# Patient Record
Sex: MALE | Race: WHITE | NOT HISPANIC OR LATINO
[De-identification: names, ages, dates, MRNs, and addresses within clinical notes are randomized per-mention and may not be internally consistent; named-entity substitution may affect disease eponyms.]

---

## 2018-08-21 PROBLEM — Z00.00 ENCOUNTER FOR PREVENTIVE HEALTH EXAMINATION: Status: ACTIVE | Noted: 2018-08-21

## 2018-08-24 ENCOUNTER — APPOINTMENT (OUTPATIENT)
Dept: PULMONOLOGY | Facility: CLINIC | Age: 78
End: 2018-08-24
Payer: MEDICARE

## 2018-08-24 VITALS
RESPIRATION RATE: 16 BRPM | HEIGHT: 66 IN | TEMPERATURE: 97.5 F | HEART RATE: 62 BPM | SYSTOLIC BLOOD PRESSURE: 111 MMHG | DIASTOLIC BLOOD PRESSURE: 70 MMHG | BODY MASS INDEX: 35.68 KG/M2 | OXYGEN SATURATION: 95 % | WEIGHT: 222 LBS

## 2018-08-24 DIAGNOSIS — R06.00 DYSPNEA, UNSPECIFIED: ICD-10-CM

## 2018-08-24 DIAGNOSIS — Z86.39 PERSONAL HISTORY OF OTHER ENDOCRINE, NUTRITIONAL AND METABOLIC DISEASE: ICD-10-CM

## 2018-08-24 DIAGNOSIS — Z86.79 PERSONAL HISTORY OF OTHER DISEASES OF THE CIRCULATORY SYSTEM: ICD-10-CM

## 2018-08-24 DIAGNOSIS — Z86.59 PERSONAL HISTORY OF OTHER MENTAL AND BEHAVIORAL DISORDERS: ICD-10-CM

## 2018-08-24 DIAGNOSIS — G47.33 OBSTRUCTIVE SLEEP APNEA (ADULT) (PEDIATRIC): ICD-10-CM

## 2018-08-24 LAB — POCT - HEMOGLOBIN (HGB), QUANTITATIVE, TRANSCUTANEOUS: 10.4

## 2018-08-24 PROCEDURE — 71046 X-RAY EXAM CHEST 2 VIEWS: CPT

## 2018-08-24 PROCEDURE — 94060 EVALUATION OF WHEEZING: CPT

## 2018-08-24 PROCEDURE — 95012 NITRIC OXIDE EXP GAS DETER: CPT

## 2018-08-24 PROCEDURE — 88738 HGB QUANT TRANSCUTANEOUS: CPT

## 2018-08-24 PROCEDURE — 99204 OFFICE O/P NEW MOD 45 MIN: CPT | Mod: 25

## 2018-08-26 PROBLEM — Z86.59 HISTORY OF DEPRESSION: Status: RESOLVED | Noted: 2018-08-26 | Resolved: 2018-08-26

## 2018-08-26 PROBLEM — Z86.39 HISTORY OF DIABETES MELLITUS: Status: RESOLVED | Noted: 2018-08-26 | Resolved: 2018-08-26

## 2018-08-26 PROBLEM — G47.33 OSA (OBSTRUCTIVE SLEEP APNEA): Status: ACTIVE | Noted: 2018-08-26

## 2018-08-26 PROBLEM — Z86.39 HISTORY OF HYPERLIPIDEMIA: Status: RESOLVED | Noted: 2018-08-26 | Resolved: 2018-08-26

## 2018-08-26 PROBLEM — Z86.79 HISTORY OF HYPERTENSION: Status: RESOLVED | Noted: 2018-08-26 | Resolved: 2018-08-26

## 2018-08-26 RX ORDER — BENZONATATE 200 MG/1
200 CAPSULE ORAL
Qty: 20 | Refills: 0 | Status: DISCONTINUED | COMMUNITY
Start: 2018-06-14

## 2018-08-26 RX ORDER — ALBUTEROL SULFATE 90 UG/1
108 (90 BASE) POWDER, METERED RESPIRATORY (INHALATION)
Qty: 1 | Refills: 0 | Status: ACTIVE | COMMUNITY
Start: 2018-05-03

## 2018-08-26 RX ORDER — FLUTICASONE PROPIONATE 50 UG/1
50 SPRAY, METERED NASAL
Qty: 16 | Refills: 0 | Status: DISCONTINUED | COMMUNITY
Start: 2018-05-03

## 2018-08-26 RX ORDER — PRAVASTATIN SODIUM 40 MG/1
40 TABLET ORAL
Qty: 90 | Refills: 0 | Status: ACTIVE | COMMUNITY
Start: 2017-12-19

## 2018-08-26 RX ORDER — SITAGLIPTIN AND METFORMIN HYDROCHLORIDE 50; 1000 MG/1; MG/1
50-1000 TABLET, FILM COATED ORAL
Qty: 180 | Refills: 0 | Status: ACTIVE | COMMUNITY
Start: 2017-12-13

## 2018-08-26 RX ORDER — CARVEDILOL PHOSPHATE 20 MG/1
20 CAPSULE, EXTENDED RELEASE ORAL
Qty: 90 | Refills: 0 | Status: ACTIVE | COMMUNITY
Start: 2018-08-10

## 2018-08-26 RX ORDER — VALSARTAN 80 MG/1
80 TABLET ORAL
Qty: 90 | Refills: 0 | Status: DISCONTINUED | COMMUNITY
Start: 2017-12-22

## 2018-08-26 RX ORDER — CLARITHROMYCIN 500 MG/1
500 TABLET, FILM COATED ORAL
Qty: 20 | Refills: 0 | Status: DISCONTINUED | COMMUNITY
Start: 2018-06-14

## 2018-08-26 RX ORDER — CICLOPIROX OLAMINE 7.7 MG/G
0.77 CREAM TOPICAL
Qty: 60 | Refills: 0 | Status: DISCONTINUED | COMMUNITY
Start: 2018-07-06

## 2018-08-26 RX ORDER — SERTRALINE HYDROCHLORIDE 100 MG/1
100 TABLET, FILM COATED ORAL
Qty: 90 | Refills: 0 | Status: ACTIVE | COMMUNITY
Start: 2018-04-05

## 2021-12-21 ENCOUNTER — APPOINTMENT (OUTPATIENT)
Dept: PULMONOLOGY | Facility: CLINIC | Age: 81
End: 2021-12-21
Payer: MEDICARE

## 2021-12-21 DIAGNOSIS — R07.0 PAIN IN THROAT: ICD-10-CM

## 2021-12-21 DIAGNOSIS — U07.1 COVID-19: ICD-10-CM

## 2021-12-21 PROCEDURE — 99443: CPT | Mod: CS,95

## 2021-12-21 NOTE — HISTORY OF PRESENT ILLNESS
[Home] : at home, [unfilled] , at the time of the visit. [Medical Office: ([facility])___] : at the medical office located in  [Verbal consent obtained from patient] : the patient, [unfilled] [FreeTextEntry1] : 82 yo M presents for initial visit for COVID-19 infection.\par Patient unable to access the [email or web address] site, visit performed over the telephone.\par Date of onset of symptoms: 12/18\par Symptoms: sore throat, ear pain, chills, unable to eat 2/2 throat pain, +cough productive of yellow-green sputum \par Max temp 100\par Date of COVID positive test: 12/20 - rapid test, Was not swabbed for Strept\par Lives with wife- she has been negative on both rapid and pcr testing.\par Treatment to date: Aleve\par Has not been monitoring O2 sats, doesn't own a pulse oximeter. \par Pertinent labs and imaging: none\par \par PMH: DM, asthma\par Smoker- remote, quit 40 years ago\par COVID- Recieved Pfizer x3 - booster 6 weeks ago \par \par Meds: Losartan, Sertraline, Coreg, Metformin, Rosuvastatin, ASA 81 mg \par \par [facility] \par Called and unable to get MAB there\par Went to [facility] yesterday and waited 7 hours to infusion - was unable to obtain. \par

## 2021-12-21 NOTE — PLAN
[FreeTextEntry1] : COVID 19 respiratory infection\par Plan:\par Pulse oximetry monitoring, instructed to call if SpO2 <94% and worsening SOB- wife to purchase pule oximeter\par Labs ordered for home draw: CBC, CMP, d-dimer, Ferritin, CRP, Procalcitonin, Strept throat culture\par Medications- c/w supportive care - Tylenol, Cepacol throat lozengeres\par Qualify for Monoclonal antibodies? High risk? yes- 82 yo, obese, DM, asthma. Will send referral for home MAB infusion. \par \par Follow up Televisit on 12/23

## 2021-12-23 ENCOUNTER — APPOINTMENT (OUTPATIENT)
Dept: DISASTER EMERGENCY | Facility: HOSPITAL | Age: 81
End: 2021-12-23

## 2021-12-23 ENCOUNTER — OUTPATIENT (OUTPATIENT)
Dept: OUTPATIENT SERVICES | Facility: HOSPITAL | Age: 81
LOS: 1 days | End: 2021-12-23
Payer: MEDICARE

## 2021-12-23 VITALS — WEIGHT: 216.93 LBS | HEIGHT: 66 IN

## 2021-12-23 VITALS
HEART RATE: 57 BPM | TEMPERATURE: 98 F | DIASTOLIC BLOOD PRESSURE: 72 MMHG | OXYGEN SATURATION: 97 % | RESPIRATION RATE: 18 BRPM | SYSTOLIC BLOOD PRESSURE: 123 MMHG

## 2021-12-23 DIAGNOSIS — U07.1 COVID-19: ICD-10-CM

## 2021-12-23 PROCEDURE — M0243: CPT

## 2021-12-23 RX ORDER — SODIUM CHLORIDE 9 MG/ML
250 INJECTION INTRAMUSCULAR; INTRAVENOUS; SUBCUTANEOUS
Refills: 0 | Status: COMPLETED | OUTPATIENT
Start: 2021-12-23 | End: 2021-12-23

## 2021-12-23 RX ADMIN — SODIUM CHLORIDE 310 MILLILITER(S): 9 INJECTION INTRAMUSCULAR; INTRAVENOUS; SUBCUTANEOUS at 14:47

## 2021-12-23 NOTE — CHART NOTE - NSCHARTNOTEFT_GEN_A_CORE
CC: Monoclonal Antibody Infusion - COVID 19 Positive or significant COVID exposure       History: Patient presents for infusion of monoclonal antibody infusion. Patient has been screened and was deemed to be a candidate.    Date tested positive: 12/21/21      COVID Symptoms: Yes  Date of onset: 12/19/21  * Sore throat  * Cough-  * SOB-    Risk Profile includes:   * DM-    * Htn-   * Obesity-   * OTHER: Age >65      Vaccination Status: Yes  Date received 2nd dose: Documented by RN      No Known Allergies      casirivimab/imdevimab in sodium chloride 0.9% (EUA) IVPB 100 milliLiter(s) IV Intermittent once  sodium chloride 0.9%. 250 milliLiter(s) IV Continuous <Continuous>        PMHx:  Infection due to severe acute respiratory syndrome coronavirus 2 (SARS-CoV-2)    MEWS Score          T(C): 36.5 (12-23-21 @ 14:14), Max: 36.5 (12-23-21 @ 14:14)  HR: 63 (12-23-21 @ 14:14) (63 - 63)  BP: 136/76 (12-23-21 @ 14:14) (136/76 - 136/76)  RR: 18 (12-23-21 @ 14:14) (18 - 18)  SpO2: 96% (12-23-21 @ 14:14) (96% - 96%)      PE- Well developed, well-nourish, resting comfortably in NAD.   Neuro- A&Ox3, no gross sensory deficits to light touch or motor weaknesses.   Vasc- No peripheral edema or venous stasis noted.  Skin- No ecchymosis or bleeding.  MS- No bony tenderness       ASSESSMENT:  Pt is a 81y year old Male COVID positive and symptomatic who was referred to the infusion center for Monoclonal antibody infusion (Regeneron).      PLAN:  - infusion procedure explained to patient   - Consent for monoclonal antibody infusion obtained   - Risk & benefits discussed/all questions answered  - infusion of Regeneron   - will observe patient for one hour post infusion  and then if stable discharge home with outpatient follow up as planned by PMD.    POST INFUSION ASSESSMENT:   DISCHARGE at approximately 1630 hours    - Patient tolerated infusion well denies complaints of chest pain, SOB, dizziness or palpitations  - VSS for discharge home  - D/C instructions given/ fact sheet included.  - Patient to follow-up with PCP as needed. see sheet

## 2021-12-24 ENCOUNTER — TRANSCRIPTION ENCOUNTER (OUTPATIENT)
Age: 81
End: 2021-12-24
